# Patient Record
Sex: MALE | Race: OTHER | HISPANIC OR LATINO | Employment: FULL TIME | URBAN - METROPOLITAN AREA
[De-identification: names, ages, dates, MRNs, and addresses within clinical notes are randomized per-mention and may not be internally consistent; named-entity substitution may affect disease eponyms.]

---

## 2022-01-28 ENCOUNTER — APPOINTMENT (OUTPATIENT)
Dept: RADIOLOGY | Facility: IMAGING CENTER | Age: 37
End: 2022-01-28
Attending: NURSE PRACTITIONER
Payer: COMMERCIAL

## 2022-01-28 ENCOUNTER — OFFICE VISIT (OUTPATIENT)
Dept: URGENT CARE | Facility: CLINIC | Age: 37
End: 2022-01-28
Payer: COMMERCIAL

## 2022-01-28 VITALS
RESPIRATION RATE: 18 BRPM | OXYGEN SATURATION: 99 % | HEART RATE: 75 BPM | HEIGHT: 70 IN | BODY MASS INDEX: 29.81 KG/M2 | SYSTOLIC BLOOD PRESSURE: 106 MMHG | TEMPERATURE: 98.8 F | WEIGHT: 208.2 LBS | DIASTOLIC BLOOD PRESSURE: 68 MMHG

## 2022-01-28 DIAGNOSIS — S69.91XA FINGER INJURY, RIGHT, INITIAL ENCOUNTER: ICD-10-CM

## 2022-01-28 DIAGNOSIS — S62.626A CLOSED DISPLACED FRACTURE OF MIDDLE PHALANX OF RIGHT LITTLE FINGER, INITIAL ENCOUNTER: ICD-10-CM

## 2022-01-28 PROBLEM — E23.2 DIABETES INSIPIDUS (HCC): Status: ACTIVE | Noted: 2021-04-28

## 2022-01-28 PROBLEM — E23.0 HYPOPITUITARISM (HCC): Status: ACTIVE | Noted: 2020-02-11

## 2022-01-28 PROCEDURE — 73140 X-RAY EXAM OF FINGER(S): CPT | Mod: TC,FY,RT | Performed by: RADIOLOGY

## 2022-01-28 PROCEDURE — 99202 OFFICE O/P NEW SF 15 MIN: CPT | Performed by: NURSE PRACTITIONER

## 2022-01-29 NOTE — PROGRESS NOTES
Subjective     Tate Rodgers is a 36 y.o. male who presents with Hand Injury (right pinky, injured 1/26)            Hand Injury  This is a new problem. Episode onset: pt reports he got hit in his right pinky finger 2 days ago by a hockey puck. He reports pain, swelling and bruising to right little finger. He has tried ice for the symptoms. The treatment provided no relief.       Review of Systems   Musculoskeletal:        Right little finger injury   All other systems reviewed and are negative.         Past Medical History:   Diagnosis Date   • Diabetes insipidus (HCC)    • Psychiatric problem     depression   • Snoring       Past Surgical History:   Procedure Laterality Date   • ANTROSTOMY  5/31/2011    Performed by ALVARO INIGUEZ at SURGERY SAME DAY [facility]   • ETHMOIDECTOMY  5/31/2011    Performed by ALVARO INIGUEZ at SURGERY SAME DAY [facility]   • ADENOIDECTOMY  5/31/2011    Performed by ALVARO INIGUEZ at SURGERY SAME DAY [facility]   • SEPTOTURBINOPLASTY  5/31/2011    Performed by ALVARO INIGUEZ at SURGERY SAME DAY [facility]   • OTHER  2003    tonsillectomy      Social History     Socioeconomic History   • Marital status: Single     Spouse name: Not on file   • Number of children: Not on file   • Years of education: Not on file   • Highest education level: Not on file   Occupational History   • Not on file   Tobacco Use   • Smoking status: Never Smoker   • Smokeless tobacco: Never Used   Substance and Sexual Activity   • Alcohol use: Yes     Comment: 3 per week   • Drug use: No   • Sexual activity: Not on file   Other Topics Concern   • Not on file   Social History Narrative   • Not on file     Social Determinants of Health     Financial Resource Strain:    • Difficulty of Paying Living Expenses: Not on file   Food Insecurity:    • Worried About Running Out of Food in the Last Year: Not on file   • Ran Out of Food in the Last Year: Not on file   Transportation Needs:    • Lack of Transportation  "(Medical): Not on file   • Lack of Transportation (Non-Medical): Not on file   Physical Activity:    • Days of Exercise per Week: Not on file   • Minutes of Exercise per Session: Not on file   Stress:    • Feeling of Stress : Not on file   Social Connections:    • Frequency of Communication with Friends and Family: Not on file   • Frequency of Social Gatherings with Friends and Family: Not on file   • Attends [employer]anda[employer] Services: Not on file   • Active Member of Clubs or Organizations: Not on file   • Attends Club or Organization Meetings: Not on file   • Marital Status: Not on file   Intimate Partner Violence:    • Fear of Current or Ex-Partner: Not on file   • Emotionally Abused: Not on file   • Physically Abused: Not on file   • Sexually Abused: Not on file   Housing Stability:    • Unable to Pay for Housing in the Last Year: Not on file   • Number of Places Lived in the Last Year: Not on file   • Unstable Housing in the Last Year: Not on file         Objective     /68 (BP Location: Left arm, Patient Position: Sitting, BP Cuff Size: Adult)   Pulse 75   Temp 37.1 °C (98.8 °F) (Temporal)   Resp 18   Ht 1.778 m (5' 10\")   Wt 94.4 kg (208 lb 3.2 oz)   SpO2 99%   BMI 29.87 kg/m²      Physical Exam  Vitals and nursing note reviewed.   Constitutional:       Appearance: Normal appearance.   HENT:      Head: Normocephalic and atraumatic.      Nose: Nose normal.      Mouth/Throat:      Mouth: Mucous membranes are moist.      Pharynx: Oropharynx is clear.   Eyes:      Extraocular Movements: Extraocular movements intact.      Pupils: Pupils are equal, round, and reactive to light.   Cardiovascular:      Rate and Rhythm: Normal rate and regular rhythm.   Pulmonary:      Effort: Pulmonary effort is normal.   Musculoskeletal:         General: Normal range of motion.        Hands:       Cervical back: Normal range of motion and neck supple.   Skin:     General: Skin is warm and dry.      Capillary Refill: Capillary " refill takes less than 2 seconds.   Neurological:      General: No focal deficit present.      Mental Status: He is alert and oriented to person, place, and time. Mental status is at baseline.   Psychiatric:         Mood and Affect: Mood normal.         Thought Content: Thought content normal.         Judgment: Judgment normal.                  1/28/2022 4:50 PM     HISTORY/REASON FOR EXAM:  Pain/Deformity Following Trauma.  .     TECHNIQUE/EXAM DESCRIPTION AND NUMBER OF VIEWS:  3 views of the RIGHT fingers.     COMPARISON: None     FINDINGS:  There is a displaced intra-articular fracture involving the distal aspect of the fifth middle phalanx. The fracture is comminuted. There is ulnar and posterior displacement of the distal fragments. No dislocation.     IMPRESSION:     1.  Displaced, comminuted, intra-articular fracture involving the distal aspect of the fifth middle phalanx.           Assessment & Plan        1. Finger injury, right, initial encounter  - DX-FINGER(S) 2+ RIGHT; Future    2. Closed displaced fracture of middle phalanx of right little finger, initial encounter  - Referral to Hand Surgery          Pt placed in splint  NWB affected hand  He states he is returning to [place] in 2 days and will follow up with hand surgery there  Ice compresses  Tylenol and ibuprofen as needed for pain  I followed all reasonable PPE precautions during this encounter including but not limited to use of an N95 mask, gloves, and gown if indicated.    Supportive care, differential diagnoses, and indications for immediate follow-up discussed with patient.    Pathogenesis of diagnosis discussed including typical length and natural progression.      Instructed to return to  or nearest emergency department if symptoms fail to improve, for any change in condition, further concerns, or new concerning symptoms.  Patient states understanding of the plan of care and discharge instructions.

## 2022-02-16 PROBLEM — S62.626A CLOSED DISPLACED FRACTURE OF MIDDLE PHALANX OF RIGHT LITTLE FINGER: Status: ACTIVE | Noted: 2022-02-16
